# Patient Record
Sex: MALE | Race: WHITE | NOT HISPANIC OR LATINO | Employment: FULL TIME | URBAN - METROPOLITAN AREA
[De-identification: names, ages, dates, MRNs, and addresses within clinical notes are randomized per-mention and may not be internally consistent; named-entity substitution may affect disease eponyms.]

---

## 2017-03-16 LAB
BUN SERPL-MCNC: 15 MG/DL (ref 7–18)
CALCIUM SERPL-MCNC: 9.4 MG/DL (ref 8.5–10.1)
CHLORIDE SERPL-SCNC: 108 MMOL/L (ref 98–107)
CHOLEST SERPL-MCNC: 146 MG/DL
CO2 SERPL-SCNC: 30 MMOL/L (ref 21–32)
CREAT SERPL-MCNC: 0.9 MG/DL (ref 0.6–1.3)
GLUCOSE SERPL-MCNC: 106 MG/DL (ref 74–106)
HDLC SERPL-MCNC: 25 MG/DL (ref 35–60)
LDLC SERPL CALC-MCNC: 108 MG/DL
POTASSIUM SERPL-SCNC: 4.2 MMOL/L (ref 3.5–5.1)
SODIUM SERPL-SCNC: 142 MEQ/L (ref 131–145)
TRIGL SERPL-MCNC: 81 MG/DL (ref 30–150)

## 2018-06-13 ENCOUNTER — HISTORICAL (OUTPATIENT)
Dept: ADMINISTRATIVE | Facility: HOSPITAL | Age: 43
End: 2018-06-13

## 2018-06-13 LAB
ALBUMIN SERPL-MCNC: 4.4 GM/DL (ref 3.4–5)
ALBUMIN/GLOB SERPL: 1.52 {RATIO} (ref 1.5–2.5)
ALP SERPL-CCNC: 64 UNIT/L (ref 38–126)
ALT SERPL-CCNC: 32 UNIT/L (ref 7–52)
AST SERPL-CCNC: 18 UNIT/L (ref 15–37)
BILIRUB SERPL-MCNC: 0.5 MG/DL (ref 0.2–1)
BILIRUBIN DIRECT+TOT PNL SERPL-MCNC: 0.1 MG/DL (ref 0–0.5)
BILIRUBIN DIRECT+TOT PNL SERPL-MCNC: 0.4 MG/DL
BUN SERPL-MCNC: 13 MG/DL (ref 7–18)
CALCIUM SERPL-MCNC: 8.7 MG/DL (ref 8.5–10)
CHLORIDE SERPL-SCNC: 107 MMOL/L (ref 98–107)
CHOLEST SERPL-MCNC: 147 MG/DL (ref 0–200)
CHOLEST/HDLC SERPL: 5.2 {RATIO}
CO2 SERPL-SCNC: 27 MMOL/L (ref 21–32)
CREAT SERPL-MCNC: 0.89 MG/DL (ref 0.6–1.3)
GLOBULIN SER-MCNC: 2.9 GM/DL (ref 1.2–3)
GLUCOSE SERPL-MCNC: 101 MG/DL (ref 74–106)
HDLC SERPL-MCNC: 28 MG/DL (ref 35–60)
LDLC SERPL CALC-MCNC: 102 MG/DL (ref 0–129)
POTASSIUM SERPL-SCNC: 4.3 MMOL/L (ref 3.5–5.1)
PROT SERPL-MCNC: 7.3 GM/DL (ref 6.4–8.2)
SODIUM SERPL-SCNC: 139 MMOL/L (ref 136–145)
TRIGL SERPL-MCNC: 100 MG/DL (ref 30–150)
TSH SERPL-ACNC: 6.41 MIU/ML (ref 0.35–4.94)
VLDLC SERPL CALC-MCNC: 20 MG/DL

## 2018-10-15 ENCOUNTER — HISTORICAL (OUTPATIENT)
Dept: ADMINISTRATIVE | Facility: HOSPITAL | Age: 43
End: 2018-10-15

## 2018-10-15 LAB — TSH SERPL-ACNC: 2.47 MIU/ML (ref 0.35–4.94)

## 2019-12-19 ENCOUNTER — HISTORICAL (OUTPATIENT)
Dept: ADMINISTRATIVE | Facility: HOSPITAL | Age: 44
End: 2019-12-19

## 2019-12-19 LAB — TSH SERPL-ACNC: 1.54 MIU/ML (ref 0.35–4.94)

## 2021-05-12 ENCOUNTER — HISTORICAL (OUTPATIENT)
Dept: ADMINISTRATIVE | Facility: HOSPITAL | Age: 46
End: 2021-05-12

## 2021-05-12 LAB
ABS NEUT (OLG): 5.5 X10(3)/MCL (ref 2.1–9.2)
ALBUMIN SERPL-MCNC: 4.5 GM/DL (ref 3.4–5)
ALBUMIN/GLOB SERPL: 1.8 {RATIO} (ref 1.5–2.5)
ALP SERPL-CCNC: 77 UNIT/L (ref 38–126)
ALT SERPL-CCNC: 32 UNIT/L (ref 7–52)
APPEARANCE, UA: CLEAR
AST SERPL-CCNC: 18 UNIT/L (ref 15–37)
BACTERIA #/AREA URNS AUTO: ABNORMAL /HPF
BILIRUB SERPL-MCNC: 0.4 MG/DL (ref 0.2–1)
BILIRUB UR QL STRIP: NEGATIVE MG/DL
BILIRUBIN DIRECT+TOT PNL SERPL-MCNC: 0.1 MG/DL (ref 0–0.5)
BILIRUBIN DIRECT+TOT PNL SERPL-MCNC: 0.3 MG/DL
BUN SERPL-MCNC: 15 MG/DL (ref 7–18)
CALCIUM SERPL-MCNC: 9.1 MG/DL (ref 8.5–10.1)
CHLORIDE SERPL-SCNC: 105 MMOL/L (ref 98–107)
CO2 SERPL-SCNC: 27 MMOL/L (ref 21–32)
COLOR UR: YELLOW
CREAT SERPL-MCNC: 1.2 MG/DL (ref 0.6–1.3)
ERYTHROCYTE [DISTWIDTH] IN BLOOD BY AUTOMATED COUNT: 12.1 % (ref 11.5–17)
GLOBULIN SER-MCNC: 2.5 GM/DL (ref 1.2–3)
GLUCOSE (UA): NEGATIVE MG/DL
GLUCOSE SERPL-MCNC: 100 MG/DL (ref 74–106)
HCT VFR BLD AUTO: 45.6 % (ref 42–52)
HGB BLD-MCNC: 14.8 GM/DL (ref 14–18)
HGB UR QL STRIP: ABNORMAL UNIT/L
KETONES UR QL STRIP: NEGATIVE MG/DL
LEUKOCYTE ESTERASE UR QL STRIP: NEGATIVE UNIT/L
LYMPHOCYTES # BLD AUTO: 2.7 X10(3)/MCL (ref 0.6–3.4)
LYMPHOCYTES NFR BLD AUTO: 30.3 % (ref 13–40)
MCH RBC QN AUTO: 28.4 PG (ref 27–31.2)
MCHC RBC AUTO-ENTMCNC: 32 GM/DL (ref 32–36)
MCV RBC AUTO: 87 FL (ref 80–94)
MONOCYTES # BLD AUTO: 0.8 X10(3)/MCL (ref 0.1–1.3)
MONOCYTES NFR BLD AUTO: 9.3 % (ref 0.1–24)
NEUTROPHILS NFR BLD AUTO: 60.4 % (ref 47–80)
NITRITE UR QL STRIP.AUTO: NEGATIVE
PH UR STRIP: 6 [PH]
PLATELET # BLD AUTO: 358 X10(3)/MCL (ref 130–400)
PMV BLD AUTO: 8.7 FL (ref 9.4–12.4)
POTASSIUM SERPL-SCNC: 3.9 MMOL/L (ref 3.5–5.1)
PROT SERPL-MCNC: 7 GM/DL (ref 6.4–8.2)
PROT UR QL STRIP: NEGATIVE MG/DL
PSA SERPL-MCNC: 0.79 NG/ML (ref 0–2.5)
RBC # BLD AUTO: 5.22 X10(6)/MCL (ref 4.7–6.1)
RBC #/AREA URNS HPF: ABNORMAL /HPF
SODIUM SERPL-SCNC: 143 MMOL/L (ref 136–145)
SP GR UR STRIP: 1.02
SQUAMOUS EPITHELIAL, UA: ABNORMAL /LPF
TSH SERPL-ACNC: 0.95 MIU/ML (ref 0.35–4.94)
UROBILINOGEN UR STRIP-ACNC: 0.2 MG/DL
WBC # SPEC AUTO: 9 X10(3)/MCL (ref 4.5–11.5)
WBC #/AREA URNS AUTO: ABNORMAL /[HPF]

## 2021-05-13 LAB — EST CREAT CLEARANCE SER (OHS): 122.56 ML/MIN

## 2021-10-12 ENCOUNTER — HISTORICAL (OUTPATIENT)
Dept: ADMINISTRATIVE | Facility: HOSPITAL | Age: 46
End: 2021-10-12

## 2022-05-02 NOTE — HISTORICAL [facility] CERNER
This is a historical note converted from [place]. Formatting and pictures may have been removed.  Please reference [place] for original formatting and attached multimedia. Chief Complaint  back pain  History of Present Illness  46-year-old  male?presents office today with complaints of?mid back pain. ?Also due for recheck of hypothyroidism hypertension.? Reports 100% compliance with all prescribed medications. ?Denies any side effects as adverse reaction/intolerance. ?Medications are working well/as intended. ?Patient ?is receiving desired effects.? Concerning his mid back pain,?no obvious new mechanism of injury/blunt trauma.? Did not?experience any?abrupt change/clicking/popping/snapping?during time of onset of symptoms.? Onset of symptoms approximately 2-3 days prior to office visit.? Has been attempted to utilize?OTC regimens including?but not limited to?heat?and cold therapy.? Also utilizing?OTC?NSAIDs. ?Not experiencing much of any relief or improvement of symptoms.? Aggravating factors include?moving/changing positions etc.  Review of Systems  ?14 point Review of Systems performed with no exceptions for new complaints other than as noted in HPI.  Physical Exam  Vitals & Measurements  BP:?138/88?  HT:?181.61?cm? HT:?181.61?cm? WT:?113.4?kg? WT:?113.400?kg? BMI:?34.38?  Well developed, well nourished, NAD, alert and oriented x4  Vitals reviewed  Head: NCAT  Eyes: EOMI, conjunctiva WNL, pupils symmetric  Neck: supple, FROM, no LA, no thyromegaly, no bruits auscultated  CV: RRR no MRG, peripheral pulses intact  Pulmonary: Effort normal and breath sounds normal, no wheeze  Abdomen: soft, NTND, no HSM; ? NABS; no peritoneal signs ? ??  Musculoskeletal: ?no tenderness, joints wnl for acute changes, FROM, no CCE  Skin: warm, dry, intact  Neuro: no motor/sensory deficits, cranial nerves grossly intact, cerebellar function appears intact  Lymphadenopathy: no abnormalities noted  Psychiatric: Cooperative,  appropriate mood and affect, appears to have normal judgment  ?  Assessment/Plan  1.?Thoracic back pain?M54.6  ?2 view?x-ray of the thoracic spine?per my interpretation?and negative for any gross abnormality/obvious?abnormality.? No obvious degenerative disc changes.? Most likely?muscle strain/spasm.? 60 mg intramuscular Toradol.? Educated on?the likely?leyva of benefiting?from physical therapy/chiropractic sessions.? Also recommend increasing frequency of alternating heat and cold therapy.? Recommend topical lidocaine patches etc.  2.?Hypothyroid?E03.9  Well-controlled on current medication regimen. ?Continue as prescribed.  3.?HTN (hypertension)?I10  Well-controlled on current medication regimen. ?Continue as prescribed.  Turn to clinic in?2-3 months for?recheck of hypertension hypothyroidism.   Problem List/Past Medical History  Ongoing  Excessive daytime sleepiness  Fatigue  HTN (hypertension)  Hypothyroid  Liver abscess  Smokeless tobacco use  TMJ disease  Historical  No qualifying data  Procedure/Surgical History  ACDF neck surgery c4-t7 (2010)  Excision of gynecomastia (1995)  shoulder (1993)  knee (1992)  Bilateral inguinal hernia repair  jaw bone graft secondary to tooth abscess   Medications  levothyroxine 200 mcg (0.2 mg) oral tablet, 200 mcg= 1 tab(s), Oral, Daily, 1 refills  lisinopril 20 mg oral tablet, 20 mg= 1 tab(s), Oral, Daily, 1 refills  Allergies  iodine?(Unknown)  Social History  Abuse/Neglect  No, 10/12/2021  Alcohol  Employment/School  Employed, 04/18/2019  Substance Use  Never, 04/18/2019  Tobacco  Never (less than 100 in lifetime), N/A, 10/12/2021  Family History  Hypertension.: Father.  Hypothyroidism.: Father and Sister.  Leukemia: Mother.  Health Maintenance  Health Maintenance  ???Pending?(in the next year)  ??? ??OverDue  ??? ? ? ?Hypertension Management-BMP due??06/13/19??and every 1??year(s)  ??? ? ? ?Alcohol Misuse Screening due??01/02/21??and every 1??year(s)  ??? ? ? ?Diabetes  Screening due??06/12/21??and every 3??year(s)  ??? ??Due?  ??? ? ? ?ADL Screening due??11/28/21??and every 1??year(s)  ??? ? ? ?Depression Screening due??11/28/21??Unknown Frequency  ??? ? ? ?Hypertension Management-Education due??11/28/21??and every 1??year(s)  ??? ? ? ?Tetanus Vaccine due??11/28/21??and every 10??year(s)  ??? ??Due In Future?  ??? ? ? ?Obesity Screening not due until??01/01/22??and every 1??year(s)  ??? ? ? ?Blood Pressure Screening not due until??10/12/22??and every 1??year(s)  ??? ? ? ?Body Mass Index Check not due until??10/12/22??and every 1??year(s)  ??? ? ? ?Hypertension Management-Blood Pressure not due until??10/12/22??and every 1??year(s)  ???Satisfied?(in the past 1 year)  ??? ??Satisfied?  ??? ? ? ?Blood Pressure Screening on??10/12/21.??Satisfied by Eileen Steven LPN  ??? ? ? ?Body Mass Index Check on??10/12/21.??Satisfied by Eileen Steven LPN  ??? ? ? ?Diabetes Screening on??05/12/21.??Satisfied by Blane Judd  ??? ? ? ?Hypertension Management-Blood Pressure on??10/12/21.??Satisfied by Eileen Steven LPN  ??? ? ? ?Obesity Screening on??10/12/21.??Satisfied by Eileen Steven LPN  ?      Physician?was in the building when patient was?seen and examined by the nurse practitioner.? I concur with the?assessment/plan/management?of the patient.

## 2022-05-02 NOTE — HISTORICAL [facility]
This is a historical note converted from [place]. Formatting and pictures may have been removed.  Please reference [place] for original formatting and attached multimedia. Chief Complaint  Refill Synthroid, recheck BP  Fatigue  History of Present Illness  last TSH was 1.5?at his last office visit in dec 2019  Having some excessive daytime sleepiness,?occ wakes up gasping, does have some heavy snoring; some witnessed apnea  Has been having?headaches and?still having elevated home blood pressures, diastolic blood pressures in the 90s  No cardiac or respiratory complaints  No other neuro complaints  Stress level has been high  No?blurry vision etc.  Review of Systems  No new complaints except as explained HPI  ?  Physical Exam  Vitals & Measurements  HR:?88(Peripheral)?  HT:?181.61?cm? WT:?110.3?kg?  ?  PHYSICAL EXAM  ?   WDWN patient in NAD, ?AFVSS  HEENT - no acute abnormality  ??????????????? oropharynx WNL  HEART - RRR  LUNGS -? CTA  ABDOMEN - benign, NTND;? no peritoneal signs  EXTREMITIES - No CCE  SKIN - warm, dry, intact  PSYCH - affect appropriate;? alert and oriented  NEURO- no new deficits noted;? cranial nerves grossly intact  ?  Assessment/Plan  1.?HTN (hypertension)?I10  Discussed his symptoms and options, will go ahead and start?lisinopril 10mg, We discussed symptoms and options at length.? Risks/benefits/alternatives/side effects discussed at length; patient verbalized understanding;  ?  ekg  cxr  cbc,cmp,tsh, ua  Plan to do fasting lipid profile in the future, he just ate some gumbo  Return to clinic 1 month?with wellness  Ordered:  CBC w/ Auto Diff, Routine collect, 05/12/21 16:00:00 CDT, Blood, Order for future visit, Stop date 05/12/21 16:00:00 CDT, Lab Collect, HTN (hypertension), 05/12/21 16:00:00 CDT  Clinic Follow up, *Est. 06/12/21 3:00:00 CDT, 30 MIN CPX, Order for future visit, HTN (hypertension), [facility]  Comprehensive Metabolic Panel, Routine collect, 05/12/21 16:00:00 CDT, Blood, Order for  future visit, Stop date 05/12/21 16:00:00 CDT, Lab Collect, HTN (hypertension), 05/12/21 16:00:00 CDT  Electrocardiogram, Complete [id number] PC, 05/12/21 15:59:00 CDT, [facility]LINK AMB - [facility], 05/12/21 15:59:00 CDT, HTN (hypertension)  Lab Collection Request, 05/12/21 16:00:00 CDT, [facility] AMB - [facility], 05/12/21 16:00:00 CDT, HTN (hypertension)  Office/Outpatient Visit Level 4 Established 11203 PC, HTN (hypertension)  Hypothyroid  Fatigue  Excessive daytime sleepiness, [facility] AMB - [facility], 05/12/21 15:59:00 CDT  Thyroid Stimulating Hormone, Routine collect, 05/12/21 16:00:00 CDT, Blood, Order for future visit, Stop date 05/12/21 16:00:00 CDT, Lab Collect, Hypothyroid  HTN (hypertension), 05/12/21 16:00:00 CDT  Urinalysis no Reflex, Routine collect, Urine, Order for future visit, 05/12/21 16:00:00 CDT, Stop date 05/12/21 16:00:00 CDT, Nurse collect, HTN (hypertension)  XR Chest 2 Views, Routine, 05/12/21 16:00:00 CDT, None, Ambulatory, Rad Type, HTN (hypertension), Not Scheduled, [facility], 05/12/21 16:00:00 CDT  ?  2.?Hypothyroid?E03.9  ?recheck?TSH  Ordered:  Office/Outpatient Visit Level 4 Established 13702 PC, HTN (hypertension)  Hypothyroid  Fatigue  Excessive daytime sleepiness, [facility] AMB - [facility], 05/12/21 15:59:00 CDT  Thyroid Stimulating Hormone, Routine collect, 05/12/21 16:00:00 CDT, Blood, Order for future visit, Stop date 05/12/21 16:00:00 CDT, Lab Collect, Hypothyroid  HTN (hypertension), 05/12/21 16:00:00 CDT  ?  3.?Excessive daytime sleepiness?G47.19  ?Strongly suspect obstructive sleep apnea,?refer for home sleep test  Ordered:  Office/Outpatient Visit Level 4 Established 08711 PC, HTN (hypertension)  Hypothyroid  Fatigue  Excessive daytime sleepiness, [facility] AMB - [facility], 05/12/21 15:59:00 CDT  ?  4.?Fatigue?R53.83  ?As above  Ordered:  Office/Outpatient Visit Level 4 Established 95364 PC, HTN (hypertension)  Hypothyroid  Fatigue  Excessive daytime sleepiness, [facility] AMB - [facility], 05/12/21 15:59:00  CDT  ?  Orders:  levothyroxine, 200 mcg = 1 tab(s), Oral, Daily, # 90 tab(s), 0 Refill(s), Pharmacy: [facility] #[id number], 181.61, cm, Height/Length Dosing, 05/12/21 15:39:00 CDT, 110.3, kg, Weight Dosing, 05/12/21 15:39:00 CDT  lisinopril, 10 mg = 1 tab(s), Oral, Daily, # 90 tab(s), 0 Refill(s), Pharmacy: [facility][id number], 181.61, cm, Height/Length Dosing, 05/12/21 15:39:00 CDT, 110.3, kg, Weight Dosing, 05/12/21 15:39:00 CDT  Prostate Specific Antigen, Routine collect, 05/12/21 16:00:00 CDT, Blood, Order for future visit, Stop date 05/12/21 16:00:00 CDT, Lab Collect, Screening PSA (prostate specific antigen), 05/12/21 16:00:00 CDT  Last office visit was December 2019  Referrals  Clinic Follow up, *Est. 06/12/21 3:00:00 CDT, 30 MIN CPX, Order for future visit, HTN (hypertension), HLink AFP   Problem List/Past Medical History  Ongoing  Excessive daytime sleepiness  Fatigue  HTN (hypertension)  Hypothyroid  Liver abscess  Smokeless tobacco use  TMJ disease  Historical  No qualifying data  Procedure/Surgical History  ACDF neck surgery c4-t7 (2010)  Excision of gynecomastia (1995)  shoulder (1993)  knee (1992)  Bilateral inguinal hernia repair  jaw bone graft secondary to tooth abscess   Medications  levothyroxine 200 mcg (0.2 mg) oral tablet, 200 mcg= 1 tab(s), Oral, Daily  lisinopril 10 mg oral tablet, 10 mg= 1 tab(s), Oral, Daily  Allergies  iodine?(Unknown)  Social History  Abuse/Neglect  No, 05/12/2021  No, 12/19/2019  Alcohol  Employment/School  Employed, 04/18/2019  Substance Use  Never, 04/18/2019  Tobacco  Never (less than 100 in lifetime), N/A, 05/12/2021  Never (less than 100 in lifetime), N/A, 12/19/2019  Family History  Hypertension.: Father.  Hypothyroidism.: Father and Sister.  Leukemia: Mother.  Health Maintenance  Health Maintenance  ???Pending?(in the next year)  ??? ??OverDue  ??? ? ? ?Hypertension Management-BMP due??06/13/19??and every 1??year(s)  ??? ? ? ?Influenza Vaccine  due??10/01/20??and every 1??day(s)  ??? ? ? ?Blood Pressure Screening due??12/18/20??and every 1??year(s)  ??? ? ? ?Body Mass Index Check due??12/18/20??and every 1??year(s)  ??? ? ? ?Hypertension Management-Blood Pressure due??12/18/20??and every 1??year(s)  ??? ? ? ?Obesity Screening due??01/01/21??and every 1??year(s)  ??? ? ? ?Alcohol Misuse Screening due??01/02/21??and every 1??year(s)  ??? ??Due?  ??? ? ? ?ADL Screening due??05/12/21??and every 1??year(s)  ??? ? ? ?Depression Screening due??05/12/21??Unknown Frequency  ??? ? ? ?Hypertension Management-Education due??05/12/21??and every 1??year(s)  ??? ? ? ?Tetanus Vaccine due??05/12/21??and every 10??year(s)  ??? ??Due In Future?  ??? ? ? ?Diabetes Screening not due until??06/12/21??and every 3??year(s)  ???Satisfied?(in the past 1 year)  ???There are no satisfied recommendations within the defined date range  ?

## 2022-05-02 NOTE — HISTORICAL [facility] CERNER
This is a historical note converted from [place]. Formatting and pictures may have been removed.  Please reference [place] for original formatting and attached multimedia. Chief Complaint  recheck thyroid  History of Present Illness  has had intermittent elevations of home BPs  has been under a lot of stress with his work; trying to get consistent with diet and exercise  Review of Systems  No new complaints except as explained HPI  ?  Physical Exam  Vitals & Measurements  HR:?80(Peripheral)? BP:?122/80?  HT:?181.61?cm? WT:?109?kg? BMI:?33.05?  ?  PHYSICAL EXAM  ?   WDWN patient in NAD, ?AFVSS  HEENT - no acute abnormality  ??????????????? oropharynx WNL  HEART - RRR  LUNGS -? CTA  ABDOMEN - benign, NTND;? no peritoneal signs  EXTREMITIES - No CCE  SKIN - warm, dry, intact  PSYCH - affect appropriate;? alert and oriented  NEURO- no new deficits noted;? cranial nerves grossly intact  ?  Assessment/Plan  1.?Hypothyroid?E03.9  ?recheck TSh  2.?Elevated BP without diagnosis of hypertension?R03.0  ?diet/exercise/ home BPs  hell call with BP update in a month or so  hell call if BPs climb  Referrals  Clinic Follow up, *Est. 05/07/20 9:30:00 CDT, Order for future visit, Hypothyroid, [facility]Link AFP   Problem List/Past Medical History  Ongoing  Elevated BP without diagnosis of hypertension  Hypothyroid  Liver abscess  Smokeless tobacco use  TMJ disease  Historical  No qualifying data  Procedure/Surgical History  ACDF neck surgery c4-t7 (2010)  Excision of gynecomastia (1995)  shoulder (1993)  knee (1992)  Bilateral inguinal hernia repair  jaw bone graft secondary to tooth abscess   Medications  levothyroxine 200 mcg (0.2 mg) oral tablet, 200 mcg= 1 tab(s), Oral, Daily, 1 refills  Allergies  iodine?(Unknown)  Social History  Abuse/Neglect  No, 12/19/2019  Alcohol  Employment/School  Employed, 04/18/2019  Substance Use  Never, 04/18/2019  Tobacco  Never (less than 100 in lifetime), N/A, 12/19/2019  Family History  Hypertension.:  Father.  Hypothyroidism.: Father and Sister.  Leukemia: Mother.  Health Maintenance  Health Maintenance  ???Pending?(in the next year)  ??? ??OverDue  ??? ? ? ?Diabetes Screening due??and every?  ??? ? ? ?Alcohol Misuse Screening due??01/01/19??and every 1??year(s)  ??? ??Due?  ??? ? ? ?ADL Screening due??12/21/19??and every 1??year(s)  ??? ? ? ?Depression Screening due??12/21/19??and every?  ??? ? ? ?Influenza Vaccine due??12/21/19??and every?  ??? ? ? ?Tetanus Vaccine due??12/21/19??and every 10??year(s)  ??? ??Due In Future?  ??? ? ? ?Obesity Screening not due until??01/01/20??and every 1??year(s)  ??? ? ? ?Blood Pressure Screening not due until??12/18/20??and every 1??year(s)  ??? ? ? ?Body Mass Index Check not due until??12/18/20??and every 1??year(s)  ???Satisfied?(in the past 1 year)  ??? ??Satisfied?  ??? ? ? ?Blood Pressure Screening on??12/19/19.??Satisfied by Eileen Steven LPN  ??? ? ? ?Body Mass Index Check on??12/19/19.??Satisfied by Eileen Steven LPN  ??? ? ? ?Obesity Screening on??12/19/19.??Satisfied by Eileen Steven LPN  ?

## 2022-06-29 PROBLEM — Z00.00 ENCOUNTER FOR WELLNESS EXAMINATION: Status: ACTIVE | Noted: 2022-06-29

## 2022-06-29 PROBLEM — B02.9 HERPES ZOSTER WITHOUT COMPLICATION: Status: ACTIVE | Noted: 2022-06-29

## 2022-06-29 PROBLEM — E03.9 HYPOTHYROIDISM: Status: ACTIVE | Noted: 2022-06-29

## 2022-06-29 PROBLEM — I10 HYPERTENSION: Status: ACTIVE | Noted: 2022-06-29

## 2022-09-17 ENCOUNTER — HISTORICAL (OUTPATIENT)
Dept: ADMINISTRATIVE | Facility: HOSPITAL | Age: 47
End: 2022-09-17

## 2022-10-03 PROBLEM — Z00.00 ENCOUNTER FOR WELLNESS EXAMINATION: Status: RESOLVED | Noted: 2022-06-29 | Resolved: 2022-10-03

## 2023-05-03 PROBLEM — E78.6 LOW HDL (UNDER 40): Status: ACTIVE | Noted: 2023-05-03

## 2023-05-03 PROBLEM — B02.9 HERPES ZOSTER WITHOUT COMPLICATION: Status: RESOLVED | Noted: 2022-06-29 | Resolved: 2023-05-03

## 2023-05-03 PROBLEM — M54.2 NECK PAIN: Status: ACTIVE | Noted: 2023-05-03

## 2023-07-06 PROCEDURE — 86803 HEPATITIS C AB TEST: CPT | Performed by: FAMILY MEDICINE

## 2023-07-06 PROCEDURE — 87389 HIV-1 AG W/HIV-1&-2 AB AG IA: CPT | Performed by: FAMILY MEDICINE
